# Patient Record
Sex: MALE | Race: WHITE | NOT HISPANIC OR LATINO | URBAN - METROPOLITAN AREA
[De-identification: names, ages, dates, MRNs, and addresses within clinical notes are randomized per-mention and may not be internally consistent; named-entity substitution may affect disease eponyms.]

---

## 2017-03-13 ENCOUNTER — IMPORTED ENCOUNTER (OUTPATIENT)
Dept: URBAN - METROPOLITAN AREA CLINIC 9 | Facility: CLINIC | Age: 32
End: 2017-03-13

## 2018-04-23 ENCOUNTER — IMPORTED ENCOUNTER (OUTPATIENT)
Dept: URBAN - METROPOLITAN AREA CLINIC 9 | Facility: CLINIC | Age: 33
End: 2018-04-23

## 2019-04-29 ENCOUNTER — IMPORTED ENCOUNTER (OUTPATIENT)
Dept: URBAN - METROPOLITAN AREA CLINIC 9 | Facility: CLINIC | Age: 34
End: 2019-04-29

## 2020-01-20 NOTE — PATIENT DISCUSSION
Discussed condition and exacerbating conditions/situations (e.g., dry/arid environments, overhead fans, air conditioners, side effect of medications). DISPLAY PLAN FREE TEXT

## 2020-05-25 ENCOUNTER — IMPORTED ENCOUNTER (OUTPATIENT)
Dept: URBAN - METROPOLITAN AREA CLINIC 9 | Facility: CLINIC | Age: 35
End: 2020-05-25

## 2021-06-18 ENCOUNTER — IMPORTED ENCOUNTER (OUTPATIENT)
Dept: URBAN - METROPOLITAN AREA CLINIC 9 | Facility: CLINIC | Age: 36
End: 2021-06-18

## 2021-06-18 PROBLEM — H04.123: Noted: 2021-06-18

## 2021-06-18 PROBLEM — H52.13: Noted: 2021-06-18

## 2021-10-18 ASSESSMENT — TONOMETRY
OD_IOP_MMHG: 16
OS_IOP_MMHG: 18
OS_IOP_MMHG: 13
OD_IOP_MMHG: 13
OD_IOP_MMHG: 15
OD_IOP_MMHG: 17
OS_IOP_MMHG: 18
OS_IOP_MMHG: 15
OS_IOP_MMHG: 14
OD_IOP_MMHG: 15

## 2021-10-18 ASSESSMENT — VISUAL ACUITY
OD_CC: 20/20 SN
OS_CC: 20/20 SN
OS_CC: 20/20 SN
OD_CC: 20/20 SN
OD_CC: 20/20 SN
OD_CC: 20/25 + SN
OS_CC: 20/20 SN
OD_CC: 20/20 SN
OD_CC: 20/20 SN

## 2022-06-20 ENCOUNTER — ESTABLISHED PATIENT (OUTPATIENT)
Dept: URBAN - METROPOLITAN AREA CLINIC 9 | Facility: CLINIC | Age: 37
End: 2022-06-20

## 2022-06-20 DIAGNOSIS — H52.13: ICD-10-CM

## 2022-06-20 DIAGNOSIS — H04.123: ICD-10-CM

## 2022-06-20 PROCEDURE — 92015 DETERMINE REFRACTIVE STATE: CPT

## 2022-06-20 PROCEDURE — 92310B CONTACT LEN 60

## 2022-06-20 PROCEDURE — 92014 COMPRE OPH EXAM EST PT 1/>: CPT

## 2022-06-20 ASSESSMENT — VISUAL ACUITY
OS_CC: 20/20
OD_CC: 20/20

## 2022-06-20 ASSESSMENT — TONOMETRY
OD_IOP_MMHG: 15
OS_IOP_MMHG: 18

## 2022-07-01 RX ORDER — CETIRIZINE HYDROCHLORIDE 10 MG/1
TABLET ORAL
COMMUNITY
End: 2022-09-23 | Stop reason: ALTCHOICE

## 2022-09-23 PROBLEM — J30.1 ALLERGIC RHINITIS DUE TO POLLEN: Status: ACTIVE | Noted: 2022-09-23

## 2023-07-14 ENCOUNTER — ESTABLISHED PATIENT (OUTPATIENT)
Dept: URBAN - METROPOLITAN AREA CLINIC 9 | Facility: CLINIC | Age: 38
End: 2023-07-14

## 2023-07-14 DIAGNOSIS — H04.123: ICD-10-CM

## 2023-07-14 DIAGNOSIS — H52.13: ICD-10-CM

## 2023-07-14 PROCEDURE — 92015 DETERMINE REFRACTIVE STATE: CPT

## 2023-07-14 PROCEDURE — 92310B CONTACT LEN 60

## 2023-07-14 PROCEDURE — 92014 COMPRE OPH EXAM EST PT 1/>: CPT

## 2023-07-14 ASSESSMENT — KERATOMETRY
OD_K1POWER_DIOPTERS: 43.75
OD_K2POWER_DIOPTERS: 44.50
OS_K1POWER_DIOPTERS: 44.00
OD_AXISANGLE2_DEGREES: 83
OD_AXISANGLE_DEGREES: 173
OS_K2POWER_DIOPTERS: 44.75
OS_AXISANGLE2_DEGREES: 98
OS_AXISANGLE_DEGREES: 008

## 2023-07-14 ASSESSMENT — TONOMETRY
OS_IOP_MMHG: 18
OD_IOP_MMHG: 16

## 2023-07-14 ASSESSMENT — VISUAL ACUITY
OD_CC: 20/20
OU_CC: 20/20
OS_CC: 20/20

## 2024-07-15 ENCOUNTER — FOLLOW UP (OUTPATIENT)
Facility: LOCATION | Age: 39
End: 2024-07-15

## 2024-07-15 DIAGNOSIS — H04.123: ICD-10-CM

## 2024-07-15 DIAGNOSIS — H52.13: ICD-10-CM

## 2024-07-15 PROCEDURE — 92015 DETERMINE REFRACTIVE STATE: CPT

## 2024-07-15 PROCEDURE — 92014 COMPRE OPH EXAM EST PT 1/>: CPT

## 2024-07-15 PROCEDURE — 92310C CONTACT LENS 75

## 2024-07-15 ASSESSMENT — KERATOMETRY
OS_K2POWER_DIOPTERS: 44.75
OD_AXISANGLE_DEGREES: 173
OD_K2POWER_DIOPTERS: 44.50
OS_AXISANGLE2_DEGREES: 98
OD_AXISANGLE2_DEGREES: 83
OS_K1POWER_DIOPTERS: 44.00
OS_AXISANGLE_DEGREES: 008
OD_K1POWER_DIOPTERS: 43.75

## 2024-07-15 ASSESSMENT — VISUAL ACUITY
OD_CC: 20/20 CTL
OS_SC: 20/400
OS_CC: 20/20 CTL
OD_SC: 20/400

## 2024-07-15 ASSESSMENT — PACHYMETRY
OD_CT_UM: 612
OS_CT_UM: 617

## 2024-07-15 ASSESSMENT — TONOMETRY
OS_IOP_MMHG: 21
OD_IOP_MMHG: 21

## 2025-03-24 ENCOUNTER — APPOINTMENT (OUTPATIENT)
Dept: URBAN - METROPOLITAN AREA CLINIC 19 | Facility: CLINIC | Age: 40
Setting detail: DERMATOLOGY
End: 2025-03-24

## 2025-03-24 DIAGNOSIS — L57.8 OTHER SKIN CHANGES DUE TO CHRONIC EXPOSURE TO NONIONIZING RADIATION: ICD-10-CM

## 2025-03-24 DIAGNOSIS — D18.0 HEMANGIOMA: ICD-10-CM

## 2025-03-24 DIAGNOSIS — L81.4 OTHER MELANIN HYPERPIGMENTATION: ICD-10-CM

## 2025-03-24 DIAGNOSIS — D22 MELANOCYTIC NEVI: ICD-10-CM

## 2025-03-24 DIAGNOSIS — L82.1 OTHER SEBORRHEIC KERATOSIS: ICD-10-CM

## 2025-03-24 DIAGNOSIS — Z71.89 OTHER SPECIFIED COUNSELING: ICD-10-CM

## 2025-03-24 PROBLEM — D22.71 MELANOCYTIC NEVI OF RIGHT LOWER LIMB, INCLUDING HIP: Status: ACTIVE | Noted: 2025-03-24

## 2025-03-24 PROBLEM — D22.72 MELANOCYTIC NEVI OF LEFT LOWER LIMB, INCLUDING HIP: Status: ACTIVE | Noted: 2025-03-24

## 2025-03-24 PROBLEM — D22.62 MELANOCYTIC NEVI OF LEFT UPPER LIMB, INCLUDING SHOULDER: Status: ACTIVE | Noted: 2025-03-24

## 2025-03-24 PROBLEM — D22.61 MELANOCYTIC NEVI OF RIGHT UPPER LIMB, INCLUDING SHOULDER: Status: ACTIVE | Noted: 2025-03-24

## 2025-03-24 PROBLEM — D22.5 MELANOCYTIC NEVI OF TRUNK: Status: ACTIVE | Noted: 2025-03-24

## 2025-03-24 PROBLEM — D18.01 HEMANGIOMA OF SKIN AND SUBCUTANEOUS TISSUE: Status: ACTIVE | Noted: 2025-03-24

## 2025-03-24 PROBLEM — D22.9 MELANOCYTIC NEVI, UNSPECIFIED: Status: ACTIVE | Noted: 2025-03-24

## 2025-03-24 PROCEDURE — 99213 OFFICE O/P EST LOW 20 MIN: CPT

## 2025-03-24 PROCEDURE — ? COUNSELING

## 2025-03-24 ASSESSMENT — LOCATION DETAILED DESCRIPTION DERM
LOCATION DETAILED: LEFT ANTERIOR PROXIMAL UPPER ARM
LOCATION DETAILED: LEFT INFERIOR CENTRAL MALAR CHEEK
LOCATION DETAILED: LEFT ANTERIOR PROXIMAL THIGH
LOCATION DETAILED: RIGHT MEDIAL SUPERIOR CHEST
LOCATION DETAILED: RIGHT ANTERIOR DISTAL UPPER ARM
LOCATION DETAILED: LEFT INFERIOR MEDIAL FOREHEAD
LOCATION DETAILED: HAIR
LOCATION DETAILED: RIGHT ANTERIOR PROXIMAL THIGH
LOCATION DETAILED: LEFT SUPERIOR MEDIAL BUCCAL CHEEK
LOCATION DETAILED: LEFT ANTERIOR DISTAL THIGH
LOCATION DETAILED: RIGHT SUPERIOR MEDIAL UPPER BACK
LOCATION DETAILED: UPPER STERNUM
LOCATION DETAILED: RIGHT MEDIAL UPPER BACK
LOCATION DETAILED: RIGHT ANTERIOR DISTAL THIGH
LOCATION DETAILED: RIGHT ANTERIOR PROXIMAL UPPER ARM

## 2025-03-24 ASSESSMENT — LOCATION ZONE DERM
LOCATION ZONE: TRUNK
LOCATION ZONE: ARM
LOCATION ZONE: LEG
LOCATION ZONE: SCALP
LOCATION ZONE: FACE

## 2025-03-24 ASSESSMENT — LOCATION SIMPLE DESCRIPTION DERM
LOCATION SIMPLE: LEFT CHEEK
LOCATION SIMPLE: HAIR
LOCATION SIMPLE: RIGHT UPPER BACK
LOCATION SIMPLE: LEFT FOREHEAD
LOCATION SIMPLE: LEFT UPPER ARM
LOCATION SIMPLE: RIGHT UPPER ARM
LOCATION SIMPLE: CHEST
LOCATION SIMPLE: LEFT THIGH
LOCATION SIMPLE: RIGHT THIGH

## 2025-07-21 ENCOUNTER — COMPREHENSIVE EXAM (OUTPATIENT)
Age: 40
End: 2025-07-21

## 2025-07-21 DIAGNOSIS — H04.123: ICD-10-CM

## 2025-07-21 DIAGNOSIS — H52.13: ICD-10-CM

## 2025-07-21 PROCEDURE — 92310-2 LEVEL 2 SOFT LENS UPDATE

## 2025-07-21 PROCEDURE — 92014 COMPRE OPH EXAM EST PT 1/>: CPT

## 2025-07-21 PROCEDURE — 92015 DETERMINE REFRACTIVE STATE: CPT
